# Patient Record
(demographics unavailable — no encounter records)

---

## 2019-02-19 RX ORDER — MONTELUKAST SODIUM 10 MG/1
TABLET ORAL
Qty: 90 TABLET | OUTPATIENT
Start: 2019-02-19

## 2019-02-27 NOTE — TELEPHONE ENCOUNTER
Received refill request from [facility] for Montelukast 10mg, Per [facility], they have not completed this refill - left vm for pt to call office to discuss if has refilled this medication elsewhere